# Patient Record
Sex: FEMALE | Race: BLACK OR AFRICAN AMERICAN
[De-identification: names, ages, dates, MRNs, and addresses within clinical notes are randomized per-mention and may not be internally consistent; named-entity substitution may affect disease eponyms.]

---

## 2020-03-04 ENCOUNTER — HOSPITAL ENCOUNTER (EMERGENCY)
Dept: HOSPITAL 97 - ER | Age: 60
Discharge: HOME | End: 2020-03-04
Payer: COMMERCIAL

## 2020-03-04 VITALS — DIASTOLIC BLOOD PRESSURE: 67 MMHG | TEMPERATURE: 97.2 F | SYSTOLIC BLOOD PRESSURE: 119 MMHG | OXYGEN SATURATION: 100 %

## 2020-03-04 DIAGNOSIS — Z95.0: ICD-10-CM

## 2020-03-04 DIAGNOSIS — I10: ICD-10-CM

## 2020-03-04 DIAGNOSIS — M16.10: ICD-10-CM

## 2020-03-04 DIAGNOSIS — I48.91: ICD-10-CM

## 2020-03-04 DIAGNOSIS — M54.17: Primary | ICD-10-CM

## 2020-03-04 DIAGNOSIS — E07.9: ICD-10-CM

## 2020-03-04 DIAGNOSIS — M47.9: ICD-10-CM

## 2020-03-04 PROCEDURE — 72100 X-RAY EXAM L-S SPINE 2/3 VWS: CPT

## 2020-03-04 PROCEDURE — 93005 ELECTROCARDIOGRAM TRACING: CPT

## 2020-03-04 NOTE — RAD REPORT
EXAM DESCRIPTION:  RAD - Lumbar Spine 3 Views - 3/4/2020 1:36 pm

 

CLINICAL HISTORY:  PAIN

Radiculopathy

 

COMPARISON:  Lumbar Spine 3 Views dated 7/12/2019; Lumbar Spine 3 Views dated 9/14/2018; SPINE LUMBAR
 W OBLIQUE dated 9/5/2012; LUMBAR SPINE 3 VIEWS dated 5/15/2008

 

FINDINGS:  Vertebral body heights appear maintained. No compression fracture noted. Multilevel degene
rative changes are present throughout the lower lumbar levels, with disc thinning, posterior osteophy
te and vacuum disc degeneration seen at L3-4 and L5-S1. Mild disc thinning with posterior osteophyte 
and facet hypertrophy is present at L4-5.

 

Mild degenerative dextroscoliosis. Tiny calcifications are present projecting over both kidneys, like
ly nephrolithiasis.

 

IMPRESSION:  Mild to moderate lower lumbar spondylosis.

 

Bilateral nephrolithiasis.

## 2020-03-04 NOTE — ER
Nurse's Notes                                                                                     

 [facility]                                                                 

Name: Hailee Damian                                                                              

Age: 59 yrs                                                                                       

Sex: Female                                                                                       

: 1960                                                                                   

MRN: [id number]                                                                                   

Arrival Date: 2020                                                                          

Time: 10:05                                                                                       

Account#: [id number]                                                                            

Bed 19                                                                                            

Private MD: TAY Painting C                                                                            

Diagnosis: Spondylosis, unspecified;Radiculopathy, lumbosacral region;Osteoarthritis of hip,      

  unspecified                                                                                     

                                                                                                  

Presentation:                                                                                     

                                                                                             

10:20 Chief complaint: Patient states: L upper thigh pain when walking and unable to lift L   ca1 

      leg. Feels corwin and tightening. Had L knee surgery a year ago. Coronavirus           

      screen: The patient has NOT traveled to a country currently being monitored by the CDC      

      within the last 14 days. The patient has NOT had contact with any known and/or              

      suspected case of coronavirus. Ebola Screen: Patient negative for fever greater than or     

      equal to 101.5 degrees Fahrenheit, and additional compatible Ebola Virus Disease            

      symptoms Patient denies exposure to infectious person. Patient denies travel to an          

      Ebola-affected area in the 21 days before illness onset. No symptoms or risks               

      identified at this time. Initial Sepsis Screen: Does the patient meet any 2 criteria?       

      No. Patient's initial sepsis screen is negative. Does the patient have a suspected          

      source of infection? No. Patient's initial sepsis screen is negative. Risk Assessment:      

      Do you want to hurt yourself or someone else? Patient reports no desire to harm self or     

      others. Onset of symptoms was 2020.                                               

10:20 Method Of Arrival: Wheelchair                                                           ca1 

10:20 Acuity: ELYSE 3                                                                           ca1 

                                                                                                  

Triage Assessment:                                                                                

10:26 General: Appears in no apparent distress. comfortable, Behavior is calm, cooperative,   ca1 

      appropriate for age. Pain: Complains of pain in left leg.                                   

                                                                                                  

Historical:                                                                                       

- Allergies:                                                                                      

10:26 No Known Allergies;                                                                     ca1 

- Home Meds:                                                                                      

10:26 levothyroxine 50 mcg tab 1 tab once daily [Active]; levothyroxine 200 mcg tab 1 tab     ca1 

      once daily [Active]; carvedilol 12.5 mg oral tab 1 tab 2 times per day [Active];            

      gabapentin 300 mg oral cap 2 caps daily [Active]; Xarelto 20 mg oral tab 1 tab once         

      daily [Active];                                                                             

- PMHx:                                                                                           

10:26 Pacemaker; Thyroid problem; Hypertension; Atrial Fib;                                   ca1 

- PSHx:                                                                                           

10:26 Knee surgery; Hysterectomy;                                                             ca1 

                                                                                                  

- Immunization history:: Adult Immunizations up to date, Flu vaccine is not up to date.           

- Social history:: Smoking status: Patient denies any tobacco usage or history of.                

                                                                                                  

                                                                                                  

Screenin:30 Abuse screen: Denies threats or abuse. Denies injuries from another. Nutritional        jl7 

      screening: No deficits noted. Tuberculosis screening: No symptoms or risk factors           

      identified. Fall Risk No fall in past 12 months (0 pts). Secondary diagnosis (15            

      points) impaired mobility, No IV (0 pts). Ambulatory Aid- Crutches/Cane/Walker (15          

      pts). Gait- Weak (10 pts.).                                                                 

                                                                                                  

Assessment:                                                                                       

12:30 General: Appears in no apparent distress. uncomfortable, Behavior is calm, cooperative, jl7 

      appropriate for age. Pain: Complains of pain in left upper thigh Pain currently is 8        

      out of 10 on a pain scale. Neuro: Level of Consciousness is awake, alert, obeys             

      commands, Oriented to person, place, time, situation. Cardiovascular: Patient's skin is     

      warm and dry. Respiratory: Airway is patent Respiratory effort is even, unlabored,          

      Respiratory pattern is regular, symmetrical. Derm: Skin is pink, warm \T\ dry.              

13:30 Reassessment: Patient appears in no apparent distress at this time. Patient and/or      jl7 

      family updated on plan of care and expected duration. Pain level reassessed. Patient is     

      alert, oriented x 3, equal unlabored respirations, skin warm/dry/pink.                      

                                                                                                  

Vital Signs:                                                                                      

10:20  / 67; Pulse 60; Resp 17 S; Temp 97.2(TE); Pulse Ox 100% on R/A; Weight 136.08 kg ca1 

      (R); Height 5 ft. 6 in. (167.64 cm) (R);                                                    

10:20 Body Mass Index 48.42 (136.08 kg, 167.64 cm)                                            ca1 

                                                                                                  

ED Course:                                                                                        

10:05 Patient arrived in ED.                                                                  rg4 

10:05 TAY Painting MD is Private Physician.                                                       rg4 

10:22 Triage completed.                                                                       ca1 

10:26 Arm band placed on right wrist.                                                         ca1 

10:32 Marcus Quigley RN is Primary Nurse.                                                      jl7 

12:30 Patient has correct armband on for positive identification. Bed in low position. Call   jl7 

      light in reach. Side rails up X 1.                                                          

12:31 Sanford, Harmony, FNP-C is [facility].                                                        snw 

12:31 Narinder Berman MD is Attending Physician.                                              snw 

13:36 Hip Left 2 View XRAY In Process Unspecified.                                            EDMS

13:36 Lumbar Spine (3 Views) XRAY In Process Unspecified.                                     EDMS

13:52 TAY Painting MD is Referral Physician.                                                      snw 

13:59 EKG done, by EKG tech. reviewed by Harmony VELAZQUEZ.                               at1 

14:19 No provider procedures requiring assistance completed. Patient did not have IV access   jl7 

      during this emergency room visit.                                                           

                                                                                                  

Administered Medications:                                                                         

12:55 Drug: Valium 10 mg Route: PO;                                                           jl7 

13:30 Follow up: Response: No adverse reaction                                                jl7 

                                                                                                  

                                                                                                  

Outcome:                                                                                          

13:53 Discharge ordered by MD.                                                                snw 

14:19 Discharged to home ambulatory, with family.                                             jl7 

14:19 Condition: stable                                                                           

14:19 Discharge instructions given to patient, family, Instructed on discharge instructions,      

      follow up and referral plans. medication usage, Demonstrated understanding of               

      instructions, follow-up care, medications, Prescriptions given X 2.                         

14:20 Patient left the ED.                                                                    jl7 

                                                                                                  

Signatures:                                                                                       

Dispatcher [employer]                           EDMS                                                 

Harmony Christina FNP-C                 FNP-Csnw                                                  

Yesi Quinones, EKG Tech              EKG Tat1                                                  

Aimee Lanza                                 rg4                                                  

Marcus Quigley, RN                        RN   jl7                                                  

Yumiko Elias RN                        RN   ca1                                                  

                                                                                                  

Corrections: (The following items were deleted from the chart)                                    

13:10 12:10 General: Appears in no apparent distress. uncomfortable, Behavior is calm,        jl7 

      cooperative, appropriate for age, jl7                                                       

13:10 12:10 Pain: Complains of pain in left upper thigh Pain currently is 8 out of 10 on a    jl7 

      pain scale. jl7                                                                             

13:10 12:10 Neuro: Level of Consciousness is awake, alert, obeys commands, Oriented to        jl7 

      person, place, time, situation, jl7                                                         

13:10 12:10 Cardiovascular: Patient's skin is warm and dry. jl7                               jl7 

13:10 12:10 Respiratory: Airway is patent Respiratory effort is even, unlabored, Respiratory  jl7 

      pattern is regular, symmetrical, jl7                                                        

13:10 12:10 Derm: Skin is pink, warm \T\ dry. jl7                                               jl7

                                                                                                  

**************************************************************************************************

## 2020-03-04 NOTE — EDPHYS
Physician Documentation                                                                           

 [facility]                                                                 

Name: Hailee Damian                                                                              

Age: 59 yrs                                                                                       

Sex: Female                                                                                       

: 1960                                                                                   

MRN: [id number]                                                                                   

Arrival Date: 2020                                                                          

Time: 10:05                                                                                       

Account#: [id number]                                                                            

Bed 19                                                                                            

Private MD: TAY Painting C                                                                            

ED Physician Narinder Berman                                                                       

HPI:                                                                                              

                                                                                             

12:48 This 59 yrs old Black Female presents to ER via Wheelchair with complaints of Leg Pain. snw 

12:48 The patient presents with decreased range of motion, pain. The complaints affect the    snw 

      left hip, left upper thigh and left quadriceps. Context: The problem was sustained at       

      home, resulted from an unknown cause, the patient can partially bear weight, the            

      patient is able to ambulate, Problem is a result from a previous injury: left knee          

      surgery painful to lift left thigh. Onset: The symptoms/episode began/occurred              

      suddenly, 3 day(s) ago, and became persistent. Associated signs and symptoms: The           

      patient has no apparent associated signs or symptoms. Severity of symptoms: At their        

      worst the symptoms were moderate. The patient has not experienced similar symptoms in       

      the past. It is unknown whether or not the patient has recently seen a physician.           

                                                                                                  

Historical:                                                                                       

- Allergies:                                                                                      

10:26 No Known Allergies;                                                                     ca1 

- Home Meds:                                                                                      

10:26 levothyroxine 50 mcg tab 1 tab once daily [Active]; levothyroxine 200 mcg tab 1 tab     ca1 

      once daily [Active]; carvedilol 12.5 mg oral tab 1 tab 2 times per day [Active];            

      gabapentin 300 mg oral cap 2 caps daily [Active]; Xarelto 20 mg oral tab 1 tab once         

      daily [Active];                                                                             

- PMHx:                                                                                           

10:26 Pacemaker; Thyroid problem; Hypertension; Atrial Fib;                                   ca1 

- PSHx:                                                                                           

10:26 Knee surgery; Hysterectomy;                                                             ca1 

                                                                                                  

- Immunization history:: Adult Immunizations up to date, Flu vaccine is not up to date.           

- Social history:: Smoking status: Patient denies any tobacco usage or history of.                

                                                                                                  

                                                                                                  

ROS:                                                                                              

12:47 Constitutional: Negative for fever, chills, and weight loss, Eyes: Negative for injury, snw 

      pain, redness, and discharge, ENT: Negative for injury, pain, and discharge, Neck:          

      Negative for injury, pain, and swelling, Cardiovascular: Negative for chest pain,           

      palpitations, and edema, Respiratory: Negative for shortness of breath, cough,              

      wheezing, and pleuritic chest pain, Abdomen/GI: Negative for abdominal pain, nausea,        

      vomiting, diarrhea, and constipation, Back: Negative for injury and pain, : Negative      

      for injury, bleeding, discharge, and swelling, Skin: Negative for injury, rash, and         

      discoloration, Neuro: Negative for headache, weakness, numbness, tingling, and seizure,     

      Psych: Negative for depression, anxiety, suicide ideation, homicidal ideation, and          

      hallucinations.                                                                             

12:47 MS/extremity: Positive for decreased range of motion, pain, of the left hip and             

      anterior left thigh.                                                                        

                                                                                                  

Exam:                                                                                             

12:45 Constitutional:  This is a well developed, well nourished patient who is awake, alert,  snw 

      and in no acute distress. Head/Face:  Normocephalic, atraumatic. Eyes:  Pupils equal        

      round and reactive to light, extra-ocular motions intact.  Lids and lashes normal.          

      Conjunctiva and sclera are non-icteric and not injected.  Cornea within normal limits.      

      Periorbital areas with no swelling, redness, or edema. ENT:  Nares patent. No nasal         

      discharge, no septal abnormalities noted.  Tympanic membranes are normal and external       

      auditory canals are clear.  Oropharynx with no redness, swelling, or masses, exudates,      

      or evidence of obstruction, uvula midline.  Mucous membranes moist. Neck:  Trachea          

      midline, no thyromegaly or masses palpated, and no cervical lymphadenopathy.  Supple,       

      full range of motion without nuchal rigidity, or vertebral point tenderness.  No            

      Meningismus. Chest/axilla:  Normal chest wall appearance and motion.  Nontender with no     

      deformity.  No lesions are appreciated. Cardiovascular:  Regular rate and rhythm with a     

      normal S1 and S2.  No gallops, murmurs, or rubs.  Normal PMI, no JVD.  No pulse             

      deficits. Respiratory:  Lungs have equal breath sounds bilaterally, clear to                

      auscultation and percussion.  No rales, rhonchi or wheezes noted.  No increased work of     

      breathing, no retractions or nasal flaring. Abdomen/GI:  Soft, non-tender, with normal      

      bowel sounds.  No distension or tympany.  No guarding or rebound.  No evidence of           

      tenderness throughout. Skin:  Warm, dry with normal turgor.  Normal color with no           

      rashes, no lesions, and no evidence of cellulitis. Neuro:  Awake and alert, GCS 15,         

      oriented to person, place, time, and situation.  Cranial nerves II-XII grossly intact.      

      Motor strength 5/5 in all extremities.  Sensory grossly intact.  Cerebellar exam            

      normal.  Normal gait. Psych:  Awake, alert, with orientation to person, place and time.     

       Behavior, mood, and affect are within normal limits.                                       

12:45 Back: pain, that is moderate, of the  left anterior thigh, ROM is painful, with             

      flexion, with extension, normal spinal alignment noted, CVA tenderness, is absent,          

      vertebral tenderness, is not appreciated, muscle spasm, is not present.                     

12:45 Musculoskeletal/extremity: Extremities: grossly normal except: noted in the left leg:       

      decreased ROM, tenderness, anterior left thigh, pain on lifting leg, Circulation is         

      intact in all extremities. Sensation intact.                                                

13:54 ECG was reviewed by the Attending Physician. atrial paced rhythm                        snw 

                                                                                                  

Vital Signs:                                                                                      

10:20  / 67; Pulse 60; Resp 17 S; Temp 97.2(TE); Pulse Ox 100% on R/A; Weight 136.08 kg ca1 

      (R); Height 5 ft. 6 in. (167.64 cm) (R);                                                    

10:20 Body Mass Index 48.42 (136.08 kg, 167.64 cm)                                            ca1 

                                                                                                  

MDM:                                                                                              

12:33 Patient medically screened.                                                             snw 

12:49 Data reviewed: vital signs, nurses notes. Data interpreted: Pulse oximetry: on room air snw 

      is 100 %. Interpretation: normal. ED course: + obesity, no MRI as pt has pacemaker.         

                                                                                                  

                                                                                             

12:32 Order name: Hip Left 2 View XRAY; Complete Time: 14:07                                  snw 

                                                                                             

12:44 Order name: Lumbar Spine (3 Views) XRAY; Complete Time: 14:07                           snw 

                                                                                             

12:44 Order name: EKG; Complete Time: 12:44                                                   snw 

                                                                                             

12:44 Order name: EKG - Nurse/Tech; Complete Time: 13:56                                      snw 

                                                                                                  

Administered Medications:                                                                         

12:55 Drug: Valium 10 mg Route: PO;                                                           jl7 

13:30 Follow up: Response: No adverse reaction                                                jl7 

                                                                                                  

                                                                                                  

Disposition:                                                                                      

16:20 Co-signature as Attending Physician, Narinder Berman MD I agree with the assessment and   kdr 

      plan of care.                                                                               

                                                                                                  

Disposition:                                                                                      

20 13:53 Discharged to Home. Impression: Spondylosis, unspecified, Radiculopathy,           

  lumbosacral region, Osteoarthritis of hip, unspecified.                                         

- Condition is Stable.                                                                            

- Discharge Instructions: Arthritis, Lumbosacral Radiculopathy, Neuropathic Pain, Heat            

  Therapy, Radicular Pain.                                                                        

- Prescriptions for Mobic 7.5 mg Oral Tablet - take 1 tablet by ORAL route once daily             

  take with food; 20 tablet. orphenadrine citrate 100 mg Oral Tablet Sustained Release            

  - take 1 tablet by ORAL route 2 times per day As needed; 20 tablet.                             

- Work release form, Family Work Release, Medication Reconciliation Form, Thank You               

  Letter, Antibiotic Education, Prescription Opioid Use form.                                     

- Follow up: Emergency Department; When: As needed; Reason: Worsening of condition.               

  Follow up: TAY Painitng MD; When: 2 - 3 days; Reason: Recheck today's complaints,                   

  Continuance of care, Re-evaluation by your physician.                                           

                                                                                                  

                                                                                                  

                                                                                                  

Signatures:                                                                                       

Dispatcher [employer]                           EDNarinder Baxter MD MD   [facility]                                                  

Harmony Christina, SARAHP-C                 FNP-Csnw                                                  

Marcus Quigley RN                        RN   jl7                                                  

Yumiko Elias RN                        RN   ca1                                                  

                                                                                                  

Corrections: (The following items were deleted from the chart)                                    

13:53 13:53 2020 13:53 Discharged to Home. Impression: Spondylosis, unspecified;        snw 

      Radiculopathy, lumbosacral region. Condition is Stable. Forms are Medication                

      Reconciliation Form, Thank You Letter, Antibiotic Education, Prescription Opioid Use.       

      Follow up: Emergency Department; When: As needed; Reason: Worsening of condition.           

      Follow up: A Painting; When: 2 - 3 days; Reason: Recheck today's complaints, Continuance of     

      care, Re-evaluation by your physician. snw                                                  

14:20 13:53 2020 13:53 Discharged to Home. Impression: Spondylosis, unspecified;        jl7 

      Radiculopathy, lumbosacral region; Osteoarthritis of hip, unspecified. Condition is         

      Stable. Forms are Medication Reconciliation Form, Thank You Letter, Antibiotic              

      Education, Prescription Opioid Use. Follow up: Emergency Department; When: As needed;       

      Reason: Worsening of condition. Follow up: A Painting; When: 2 - 3 days; Reason: Recheck        

      today's complaints, Continuance of care, Re-evaluation by your physician. snw               

                                                                                                  

**************************************************************************************************

## 2020-03-04 NOTE — RAD REPORT
EXAM DESCRIPTION:  RAD - Hip Left 2 View - 3/4/2020 1:36 pm

 

CLINICAL HISTORY:  PAIN

 

COMPARISON:  No comparisons

 

FINDINGS:  Mild osteoarthritic changes affect the left hip. No fracture, dislocation or AVN.

## 2020-03-04 NOTE — EKG
Test Date:    2020-03-04               Test Time:    13:53:35

Technician:   KRISTINA                                     

                                                     

MEASUREMENT RESULTS:                                       

Intervals:                                           

Rate:         60                                     

DE:           192                                    

QRSD:         84                                     

QT:           402                                    

QTc:          402                                    

Axis:                                                

P:            39                                     

DE:           192                                    

QRS:          3                                      

T:            17                                     

                                                     

INTERPRETIVE STATEMENTS:                                       

                                                     

Electronic atrial pacemaker

Abnormal ECG

No previous ECG available for comparison



Electronically Signed On 03-04-20 15:47:55 CST by Burke Richards

## 2020-03-04 NOTE — XMS REPORT
Patient Summary Document

 Created on:2020



Patient:NOELLE VIZCAINO

Sex:Female

:1960

External Reference #:[id number]





Demographics







 Address  [address] [place] [address]

 

 Home Phone  [phone]

 

 Work Phone  [phone]

 

 Email Address  [email or web address]

 

 Preferred Language  Unknown

 

 Marital Status  Unknown

 

 Gnosticist Affiliation  Unknown

 

 Race  Unknown

 

 Additional Race(s)  Unavailable

 

 Ethnic Group  Unknown









Author







 Organization  [facility]

 

 Address  [address]



   [place] [address]

 

 Phone  [phone]









Support







 Name  Relationship  Address  Phone

 

 N, G  Unavailable  Unavailable  Unavailable

 

 N, G  Unavailable  Unavailable  Unavailable

 

 VIZCAINOTERESA  Unavailable  [address] [facility]  [phone]



     [place] [address]  

 

 NOELLE VIZCAINO  Unavailable  .  Unavailable

 

 CARRILLO YOANA  Unavailable  .  [phone]

 

 AMANDA VIZCAINO  Unavailable  .  [phone]









Care Team Providers







 Name  Role  Phone

 

 Unavailable  Unavailable  Unavailable









Payers







 Payer Name  Policy Type  Policy Number  Effective Date  Expiration Date







Problems

This patient has no known problems.



Allergies, Adverse Reactions, Alerts







 Allergy  Allergy  Status  Severity  Reaction(s)  Onset  Inactive  Treating  
Comments



 Name  Type        Date  Date  Clinician  

 

 No Known  DA  Active  U    2019      



 Allergies          -30      



           00:00:0      



           0      

 

 No Known  DA  Active  U    2019      



 Allergies          -      



           00:00:0      



           0      

 

 No Known  DA  Active  U    2019      



 Allergies          -      



           00:00:0      



           0      







Medications

This patient has no known medications.



Results







 Test Description  Test Time  Test Comments  Text Results  Atomic Results  
Result Comments









 - XR FLUORO NDL  2019 20:05:00     Patient Name: NOELLE VIZCAINO
  



       Unit No: [id number]        EXAMS:  



                                         CPT CODE:  



           [id number] XR FLUORO NDL  



                  [id number]  



       Fluoroscopically guided injection of the left  



       pes anserinus bursa with       steroid and  



       lidocaine               COMPARISON: No prior  



       exams available.               FINDINGS:  



                After informed consent was obtained  



       a needle was placed in the left       pes  



       anserinus bursa with fluoroscopic guidance.  



       Its position was       confirmed by obtaining  



       an AP radiograph.  Subsequently 2 mL of  



       Kenalog       40 mg per cc and 2 mL of 1  



       percent lidocaine was injected.  



         No immediate complications were  



       encountered.                 11 seconds of  



       fluoroscopy time was utilized.  



        IMPRESSION:                   Technically  



       successful steroid injection of the left pes  



       anserinus         bursa                   **  



       Electronically Signed by SAMANTHA Yang  



       **           **             on 2019 at  



       2005             **  



       Reported and signed by: Woody Yang M.D.  



                                     CC: Diogenes Harp  



         



         



       Technologist: Gabi Contreras RT.(R)  



                               Transcribed D/T:  



       2019 () Kaleigh  



                  [facility]  



        NAME: NOELLE VIZCAINO          [address]  



       [facility]                     PHYS: Diogenes Davis  



                      : 1960 AGE: 58  



       SEX: F   [place] [address]  



       ACCT NO: [id number] LOC: Y.RAD        PHONE  



       #: [phone]               EXAM DATE:  



       2019 STATUS: DEP CLI     FAX #:  



       [phone]               RAD #:  



       D/C DT                PAGE  1  



             Signed Report  



             Patient Name: NOELLE VIZCAION  



            Unit No: [id number]        EXAMS:  



                                              CPT  



       CODE:       [id number] XR FLUORO NDL  



                          [id number]  



       <Continued>  Orig Print D/T: S: 2019  



       ()  



                                        [facility]        NAME: NOELLE VIZCAINO          [address] [facility]  



               PHYS: Diogenes Davis  



                                            :  



       1960 AGE: 58      SEX: F   [place] [address]                ACCT NO:  



       [id number] LOC: Y.RAD        PHONE #:  



       [phone]               EXAM DATE:  



       2019 STATUS: DEP CLI     FAX #:  



       [phone]               RAD #:  



       D/C DT                PAGE  2  



             Signed Report  



         









 HGB   HCT  2019 06:03:00    









   Test Item  Value  Reference Range  Comments









 HEMOGLOBIN (test code=HGB)  10.2 g/dL  12-16  

 

 HEMATOCRIT (test code=HCT)  31.5 %  37-47  



BASIC METABOLIC [id number] 06:40:00





 Test Item  Value  Reference Range  Comments

 

 SODIUM (test code=NA)  142 mmol/L  136-145  

 

 POTASSIUM (test code=K)  4.5 mmol/L  3.5-5.1  

 

 CHLORIDE (test code=CL)  107.0 mmol/L    

 

 CARBON DIOXIDE (test  24.0 mmol/L  21-32  



 code=CO2)      

 

 GLUCOSE (test code=GLU)  128 mg/dL    

 

 BLOOD UREA NITROGEN (test  18 mg/dL  7-18  



 code=BUN)      

 

 GLOMERULAR FILTRATION RATE  100.1  >60  Unit of measure:



 (test code=GFR)      mL/min/1.73 [id number]



       Range:Healthy Adults



         >90 mL/min/1.73 m2 For



       Chronic Kidney Disease:



       Stage II     Mild Decrease



       in GFR        60-90



       Stage III    Moderate



       Decrease in GFR    30-59



        Stage IV     Severe



       Decrease in GFR      15-29



          Stage V      Kidney



       Failure               <15

 

 CREATININE (test code=CREAT)  0.72 mg/dL  0.55-1.30  

 

 CALCIUM (test code=CA)  8.5 mg/dL  8.2-10.1  



HGB   [id number] 05:45:00





 Test Item  Value  Reference Range  Comments

 

 HEMOGLOBIN (test code=HGB)  11.5 g/dL  12-16  

 

 HEMATOCRIT (test code=HCT)  35.0 %  37-47  



AB HIV  17:20:00





 Test Item  Value  Reference Range  Comments

 

 AB HIV 1 (test code=HIV1AB)  NONREACTIVE  NONREACTIVE  DONE AT:  [address] [address] by



       [employer] [facility] Gen HIV



       Ag/Ab Combo Screen



AB HIV 1    17:19:00





 Test Item  Value  Reference Range  Comments

 

 AB HIV 1   2 (test  NONREACTIVE  NONREACTIVE  Done by [employer]



 code=PPL31XM)      Gen HIV Ag/Ab Combo Screen



COMPREHENSIVE METABOLIC [id number] 13:28:00





 Test Item  Value  Reference Range  Comments

 

 SODIUM (test code=NA)  142 mmol/L  136-145  

 

 POTASSIUM (test code=K)  4.2 mmol/L  3.5-5.1  

 

 CHLORIDE (test code=CL)  105.0 mmol/L    

 

 CARBON DIOXIDE (test code=CO2)  26.8 mmol/L  21-32  

 

 GLUCOSE (test code=GLU)  86 mg/dL    

 

 BLOOD UREA NITROGEN (test  15 mg/dL  7-18  



 code=BUN)      

 

 GLOMERULAR FILTRATION RATE  78.4  >60  Unit of measure:



 (test code=GFR)      mL/min/1.73 [id number]



       Range:Healthy Adults



          >90 mL/min/1.73 m2 For



       Chronic Kidney Disease:



        Stage II     Mild



       Decrease in GFR



       60-90     Stage III



       Moderate Decrease in GFR



        30-59     Stage IV



       Severe Decrease in GFR



        15-29     Stage V



       Kidney Failure



         <15

 

 CREATININE (test code=CREAT)  0.89 mg/dL  0.55-1.30  

 

 TOTAL PROTEIN (test code=PROT)  7.7 g/dL  6.4-8.2  

 

 ALBUMIN (test code=ALB)  4.0 g/dL  3.4-5.0  

 

 GLOBULIN (test code=GLOB)  3.7 g/dL  2.2-4.2  

 

 ALBUMIN/GLOBULIN RATIO (test  1.1  0.7-2.0  



 code=A/G)      

 

 CALCIUM (test code=CA)  9.0 mg/dL  8.2-10.1  

 

 BILIRUBIN TOTAL (test  0.58 mg/dL  0.2-1.00  



 code=BILT)      

 

 SGOT/AST (test code=AST)  23.0 U/L  15-37  

 

 SGPT/ALT (test code=ALT)  20.0 U/L  12-78  Please note new normal



       range.

 

 ALKALINE PHOSPHATASE TOTAL  128 U/L    



 (test code=ALKP)      



URINALYSIS [id number] 11:34:00





 Test Item  Value  Reference Range  Comments

 

 UA COLOR (test code=COLU)  YELLOW  YELLOW  

 

 UA APPEARANCE (test code=APPU)  CLEAR  CLEAR  

 

 UA GLUCOSE DIPSTICK (test code=DGLUU)  NEGATIVE  NEGATIVE  

 

 UA BILIRUBIN DIPSTICK (test code=BILU)  NEGATIVE  NEGATIVE  

 

 UA KETONE DIPSTICK (test code=KETU)  NEGATIVE mg/dL  NEG  

 

 UA SPECIFIC GRAVITY (test code=SGU)  1.010  1.003-1.035  

 

 UA BLOOD DIPSTICK (test code=RUIZ)  NEGATIVE  NEGATIVE  

 

 UA PH DIPSTICK (test code=PHILLIP)  7.0  >6.5  

 

 UA PROTEIN DIPSTICK (test code=PROU)  NEGATIVE mg/dL  NEG  

 

 UA UROBILINIOGEN DIPSTICK (test code=URO)  2.0 mg/dL  NORM  

 

 UA NITRITE DIPSTICK (test code=ARMIN)  NEGATIVE  NEG  

 

 UA LEUKOCYTE ESTERASE DIPSTICK (test  2+  NEGATIVE  



 code=LEUU)      

 

 UA WBC (test code=WBCU)  1-2 /HPF  0-2  

 

 UA RBC (test code=RBCU)  0-2 /HPF  0-2  

 

 UA EPITHELIAL CELLS (test code=EPIU)  2-5 /HPF  0-2  

 

 UA BACTERIA (test code=BACU)  MODERATE /HPF  NONE  



PROTHROMBIN [id number] 11:12:00





 Test Item  Value  Reference Range  Comments

 

 PROTHROMBIN TIME PATIENT  12.5 secs  10.1-12.5  



 (test code=PTP)      

 

 INTERNATIONAL NORMAL RATIO  1.11  <2.0  RECOMMENDED THERAPEUTIC RANGE



 (test code=INR)      FOR ORAL



       ANTICOAGULANTTREATMENT:



          CONDITION



                INRProphylaxis of



       venous thrombosis in



       2.0 - 3.0   high-risk medical



       or surgical patientsTreatment



       of venous thrombosis



           2.0 - 3.0Prevention of



       embolism



       2.0 - 3.0Prevention of



       recurrent embolism, or



       3.0 - 4.5   patients with



       mechanical prosthetic



       intravascular valves



IS PATIENT ON ANTICOAGULANTS ? YLIST ANTICOAGULANT/ANTI PLT MEDICATION : 
AspirinHas Lab been notified if Patient is on Heparin Drip? NOTHROMBOPLASTIN 
TIME [id number] 11:12:00





 Test Item  Value  Reference Range  Comments

 

 PTT ACTIVATED (test code=APTT)  30.6 secs  24.9-37.0  



IS PATIENT ON ANTICOAGULANTS ? YLIST ANTICOAGULANT/ANTI PLT MEDICATION : 
AspirinHas Lab been notified if Patient is on Heparin Drip? NOCBC W/AUTO 
[id number] 10:58:00





 Test Item  Value  Reference Range  Comments

 

 WHITE BLOOD CELL (test code=WBC)  5.4 K/mm3  5.8-11.0  

 

 RED BLOOD CELL (test code=RBC)  4.72 M/mm3  4.2-5.4  

 

 HEMOGLOBIN (test code=HGB)  13.8 g/dL  12-16  

 

 HEMATOCRIT (test code=HCT)  42.2 %  37-47  

 

 MEAN CELL VOLUME (test code=MCV)  89 fL  80-98  

 

 MEAN CELL HGB (test code=MCH)  29.2 pg  27-34  

 

 MEAN CELL HGB CONCENTRATION (test code=MCHC)  32.7 g/dL  30.8-34.1  

 

 RED CELL DISTRIBUTION WIDTH (test code=RDW)  13.4 %  11-16  

 

 PLT (test code=PLT)  274 K/mm3  130-400  

 

 MEAN PLATELET VOLUME (test code=MPV)  9.9 fL  8.9-12.1  

 

 NEUTROPHIL % (test code=NT%)  64.0 %  45-70  

 

 LYMPHOCYTE % (test code=LY%)  24.4 %  20-40  

 

 MONOCYTE % (test code=MO%)  9.8 %  3-10  

 

 EOSINOPHIL % (test code=EO%)  0.9 %  1-5  

 

 BASOPHIL % (test code=BA%)  0.7 %  0.0-1.1  

 

 NEUTROPHIL # (test code=NT#)  3.46 K/mm3  2.00-7.50  

 

 LYMPHOCYTE # (test code=LY#)  1.32 K/mm3  1.50-4.00  

 

 MONOCYTE # (test code=MO#)  0.53 K/mm3  0.2-0.8  

 

 EOSINOPHIL # (test code=EO#)  0.05 K/mm3  0.04-0.4  

 

 BASOPHIL # (test code=BA#)  0.04 K/mm3  0.02-0.10  

 

 MANUAL DIFF REQUIRED (test code=MDIFF)  NO  MANUAL DIFF  

 

 NUCLEATED RED BLOOD CELL (test code=NRBC)  0 %  0-0